# Patient Record
Sex: FEMALE | Employment: UNEMPLOYED | URBAN - METROPOLITAN AREA
[De-identification: names, ages, dates, MRNs, and addresses within clinical notes are randomized per-mention and may not be internally consistent; named-entity substitution may affect disease eponyms.]

---

## 2018-11-14 ENCOUNTER — HOSPITAL ENCOUNTER (OUTPATIENT)
Dept: MAMMOGRAPHY | Facility: CLINIC | Age: 38
Discharge: HOME | End: 2018-11-14
Attending: OBSTETRICS & GYNECOLOGY | Admitting: OBSTETRICS & GYNECOLOGY
Payer: COMMERCIAL

## 2018-11-14 ENCOUNTER — HOSPITAL ENCOUNTER (OUTPATIENT)
Dept: ULTRASOUND IMAGING | Facility: CLINIC | Age: 38
End: 2018-11-14
Attending: OBSTETRICS & GYNECOLOGY
Payer: COMMERCIAL

## 2018-11-14 DIAGNOSIS — N64.4 BREAST PAIN, LEFT: ICD-10-CM

## 2018-11-14 PROCEDURE — 77066 DX MAMMO INCL CAD BI: CPT

## 2018-11-14 PROCEDURE — 76642 ULTRASOUND BREAST LIMITED: CPT | Mod: LT

## 2019-10-24 ENCOUNTER — THERAPY VISIT (OUTPATIENT)
Dept: PHYSICAL THERAPY | Facility: CLINIC | Age: 39
End: 2019-10-24
Payer: COMMERCIAL

## 2019-10-24 DIAGNOSIS — M54.50 BILATERAL LOW BACK PAIN WITHOUT SCIATICA: ICD-10-CM

## 2019-10-24 PROCEDURE — 97140 MANUAL THERAPY 1/> REGIONS: CPT | Mod: GP | Performed by: PHYSICAL THERAPIST

## 2019-10-24 PROCEDURE — 97112 NEUROMUSCULAR REEDUCATION: CPT | Mod: GP | Performed by: PHYSICAL THERAPIST

## 2019-10-24 PROCEDURE — 97161 PT EVAL LOW COMPLEX 20 MIN: CPT | Mod: GP | Performed by: PHYSICAL THERAPIST

## 2019-10-24 NOTE — LETTER
[facility]TH[facility]IC [facility] [place] [facility]  [address] [place] [address]  [phone]    2019    Re: Prem London   :   1980  MRN:  [id number]   REFERRING PHYSICIAN:   Eleni Peters    [facility] [place] [facility]  Date of Initial Evaluation:  10/24/2019  Visits:  Rxs Used: 1  Reason for Referral:  Bilateral low back pain without sciatica    EVALUATION SUMMARY    [facility] Initial Evaluation  Subjective:  The history is provided by the patient. No  was used.   Prem London being seen for low back pain .   Where condition occurred: for unknown reasons.Problem occurred: Patient states that she started to have back pain when she was first pregnant in  and has been off and on.  Then about 2 weeks ago the back pain has increased.  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 General health as reported by patient is fair. Pertinent medical history includes:  Thyroid problems.    Surgeries include:  Other. Other surgery history details:  .  Current medications:  None.   Primary job tasks include:  Computer work and prolonged sitting.  Pain is described as sharp and aching  Pain is worse in the A.M.. Since onset symptoms are gradually worsening. Special tests:  X-ray. Previous treatment includes chiropractic. There was none improvement following previous treatment.   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    Barriers include:  None as reported by patient.  Red flags:  None as reported by patient.  Type of problem:  Lumbar   Condition occurred with:  Insidious onset. This is a chronic condition   Problem details: Pain with prolonged positions, sitting, standing and  walking. Pain increased with sitting in long sitting and bending forward.  Pain also worst in the morning when getting out of bed.  States she has a hard time standing straight for about 30 minutes. She does reports a tingling sensation in both legs after sitting about 2-3 minutes, sitting cross legged on the floor and also standing a few minutes.   Patient reports pain:  Central lumbar spine and lumbar spine left.  Associated symptoms:  Loss of motion/stiffness and tingling. Symptoms are exacerbated by bending, walking, standing and sitting and relieved by activity/movement and NSAID's.                     Re: Prem London   :   1980            Objective:       Lumbar/SI Evaluation  ROM:    AROM Lumbar:   Flexion:            Min loss pain   Ext:                    Min loss pain    Side Bend:        Left:  WNL    Right:  WNL pain   Rotation:           Left:  Min loss pain     Right:  Min loss   Side Glide:        Left:     Right:       Strength: fair abdominal strength   Lumbar Myotomes:  normal  Neural Tension/Mobility:    Left side:SLR; SLR w/DF or Slump  negative.   Right side:   SLR w/DF; Slump or SLR  negative.   Lumbar Palpation:  Palpation (lumbar): tender over L4-2.  Tenderness present at Left:    Erector Spinae; PSIS and Vertebral  Tenderness present at Right: Erector Spinae and Vertebral  SI joint/Sacrum:      Left positive at:    Thigh thrust  Sacral conclusion left:  Upslip          Assessment/Plan:    Patient is a 39 year old female with lumbar complaints.    Patient has the following significant findings with corresponding treatment plan.                Diagnosis 1:  Low back pain   Pain -  hot/cold therapy, manual therapy, self management, education, directional preference exercise and home program  Decreased ROM/flexibility - manual therapy, therapeutic exercise, therapeutic activity and home program  Decreased strength - therapeutic exercise, therapeutic activities and home  program  Impaired muscle performance - neuro re-education and home program  Decreased function - therapeutic activities and home program     Cumulative Therapy Evaluation is: Low complexity.  Previous and current functional limitations:  (See Goal Flow Sheet for this information)    Short term and Long term goals: (See Goal Flow Sheet for this information)     Communication ability:  Patient appears to be able to clearly communicate and understand verbal and written communication and follow directions correctly.  Treatment Explanation - The following has been discussed with the patient:   RX ordered/plan of care  Anticipated outcomes  Possible risks and side effects  This patient would benefit from PT intervention to resume normal activities.   Rehab potential is good.    Frequency:  1 X week, once daily  Duration:  for 6 weeks  Discharge Plan:  Achieve all LTG.  Independent in home treatment program.  Reach maximal therapeutic benefit.      Thank you for your referral.    INQUIRIES  Therapist: Bobby Anne DPT   [facility]  [address] [place] [address]  Phone: [phone]  Fax: [phone]

## 2019-10-24 NOTE — PROGRESS NOTES
[facility] Initial Evaluation  Subjective:  The history is provided by the patient. No  was used.   Prem London being seen for low back pain .   Where condition occurred: for unknown reasons.Problem occurred: Patient states that she started to have back pain when she was first pregnant in  and has been off and on.  Then about 2 weeks ago the back pain has increased.  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 General health as reported by patient is fair. Pertinent medical history includes:  Thyroid problems.    Surgeries include:  Other. Other surgery history details:  .  Current medications:  None.   Primary job tasks include:  Computer work and prolonged sitting.  Pain is described as sharp and aching  Pain is worse in the A.M.. Since onset symptoms are gradually worsening. Special tests:  X-ray. Previous treatment includes chiropractic. There was none improvement following previous treatment.   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    Barriers include:  None as reported by patient.  Red flags:  None as reported by patient.  Type of problem:  Lumbar   Condition occurred with:  Insidious onset. This is a chronic condition   Problem details: Pain with prolonged positions, sitting, standing and walking. Pain increased with sitting in long sitting and bending forward.  Pain also worst in the morning when getting out of bed.  States she has a hard time standing straight for about 30 minutes. She does reports a tingling sensation in both legs after sitting about 2-3 minutes, sitting cross legged on the floor and also standing a few minutes.   Patient reports pain:  Central lumbar spine and lumbar spine left.  Associated  symptoms:  Loss of motion/stiffness and tingling. Symptoms are exacerbated by bending, walking, standing and sitting and relieved by activity/movement and NSAID's.                      Objective:  System         Lumbar/SI Evaluation  ROM:    AROM Lumbar:   Flexion:            Min loss pain   Ext:                    Min loss pain    Side Bend:        Left:  WNL    Right:  WNL pain   Rotation:           Left:  Min loss pain     Right:  Min loss   Side Glide:        Left:     Right:         Strength: fair abdominal strength   Lumbar Myotomes:  normal                  Neural Tension/Mobility:      Left side:SLR; SLR w/DF or Slump  negative.     Right side:   SLR w/DF; Slump or SLR  negative.   Lumbar Palpation:  Palpation (lumbar): tender over L4-2.  Tenderness present at Left:    Erector Spinae; PSIS and Vertebral  Tenderness present at Right: Erector Spinae and Vertebral        SI joint/Sacrum:        Left positive at:    Thigh thrust    Sacral conclusion left:  Upslip                                               General     ROS    Assessment/Plan:    Patient is a 39 year old female with lumbar complaints.    Patient has the following significant findings with corresponding treatment plan.                Diagnosis 1:  Low back pain   Pain -  hot/cold therapy, manual therapy, self management, education, directional preference exercise and home program  Decreased ROM/flexibility - manual therapy, therapeutic exercise, therapeutic activity and home program  Decreased strength - therapeutic exercise, therapeutic activities and home program  Impaired muscle performance - neuro re-education and home program  Decreased function - therapeutic activities and home program       Cumulative Therapy Evaluation is: Low complexity.    Previous and current functional limitations:  (See Goal Flow Sheet for this information)    Short term and Long term goals: (See Goal Flow Sheet for this information)     Communication ability:   Patient appears to be able to clearly communicate and understand verbal and written communication and follow directions correctly.  Treatment Explanation - The following has been discussed with the patient:   RX ordered/plan of care  Anticipated outcomes  Possible risks and side effects  This patient would benefit from PT intervention to resume normal activities.   Rehab potential is good.    Frequency:  1 X week, once daily  Duration:  for 6 weeks  Discharge Plan:  Achieve all LTG.  Independent in home treatment program.  Reach maximal therapeutic benefit.    Please refer to the daily flowsheet for treatment today, total treatment time and time spent performing 1:1 timed codes.

## 2019-12-05 PROBLEM — M54.50 BILATERAL LOW BACK PAIN WITHOUT SCIATICA: Status: RESOLVED | Noted: 2019-10-24 | Resolved: 2019-12-05
